# Patient Record
Sex: FEMALE | URBAN - METROPOLITAN AREA
[De-identification: names, ages, dates, MRNs, and addresses within clinical notes are randomized per-mention and may not be internally consistent; named-entity substitution may affect disease eponyms.]

---

## 2020-08-26 ENCOUNTER — OFFICE VISIT (OUTPATIENT)
Dept: URBAN - METROPOLITAN AREA CLINIC 45 | Facility: CLINIC | Age: 70
End: 2020-08-26
Payer: MEDICARE

## 2020-08-26 DIAGNOSIS — H35.371 PUCKERING OF MACULA, RIGHT EYE: ICD-10-CM

## 2020-08-26 PROCEDURE — 92134 CPTRZ OPH DX IMG PST SGM RTA: CPT | Performed by: OPTOMETRIST

## 2020-08-26 PROCEDURE — 99204 OFFICE O/P NEW MOD 45 MIN: CPT | Performed by: OPTOMETRIST

## 2020-08-26 ASSESSMENT — INTRAOCULAR PRESSURE
OS: 16
OD: 16

## 2020-08-26 NOTE — IMPRESSION/PLAN
Impression: Vitreous degeneration, right eye: H43.811. Plan: Discussed signs and symptoms of retinal detachment. Will continue to observe condition and or symptoms.

## 2020-08-26 NOTE — IMPRESSION/PLAN
Impression: Puckering of macula, right eye: H35.371.  Plan: oct ordered and done today ou- SRF OU, refer for retina consult

## 2021-03-17 ENCOUNTER — OFFICE VISIT (OUTPATIENT)
Dept: URBAN - METROPOLITAN AREA CLINIC 54 | Facility: CLINIC | Age: 71
End: 2021-03-17
Payer: MEDICARE

## 2021-03-17 DIAGNOSIS — E11.9 TYPE 2 DIABETES MELLITUS WITHOUT COMPLICATIONS: ICD-10-CM

## 2021-03-17 DIAGNOSIS — H35.073 RETINAL TELANGIECTASIS, BILATERAL: Primary | ICD-10-CM

## 2021-03-17 DIAGNOSIS — H25.13 AGE-RELATED NUCLEAR CATARACT, BILATERAL: ICD-10-CM

## 2021-03-17 DIAGNOSIS — H43.811 VITREOUS DEGENERATION, RIGHT EYE: ICD-10-CM

## 2021-03-17 PROCEDURE — 99214 OFFICE O/P EST MOD 30 MIN: CPT | Performed by: OPHTHALMOLOGY

## 2021-03-17 PROCEDURE — 92134 CPTRZ OPH DX IMG PST SGM RTA: CPT | Performed by: OPHTHALMOLOGY

## 2021-03-17 ASSESSMENT — INTRAOCULAR PRESSURE
OD: 15
OS: 12

## 2021-03-17 NOTE — IMPRESSION/PLAN
Impression: Type 2 diabetes mellitus without complications: A85.7. Plan: Thankfully, no ophthalmic manifestations at this time. BS/BP control emphasized with patient.

## 2021-03-17 NOTE — IMPRESSION/PLAN
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 We discussed that there is currently no treatment for macular telangectasia. The patient will call ASAP with any changes in vision/Amsler grid. 

6-12 months, OCT OU, reeval OU

## 2021-11-03 ENCOUNTER — OFFICE VISIT (OUTPATIENT)
Dept: URBAN - METROPOLITAN AREA CLINIC 45 | Facility: CLINIC | Age: 71
End: 2021-11-03
Payer: MEDICARE

## 2021-11-03 DIAGNOSIS — H52.4 PRESBYOPIA: ICD-10-CM

## 2021-11-03 PROCEDURE — 99213 OFFICE O/P EST LOW 20 MIN: CPT | Performed by: OPTOMETRIST

## 2021-11-03 ASSESSMENT — KERATOMETRY
OD: 46.38
OS: 46.13

## 2021-11-03 ASSESSMENT — VISUAL ACUITY
OS: 20/40
OD: 20/40

## 2022-05-11 ENCOUNTER — OFFICE VISIT (OUTPATIENT)
Dept: URBAN - METROPOLITAN AREA CLINIC 54 | Facility: CLINIC | Age: 72
End: 2022-05-11
Payer: MEDICARE

## 2022-05-11 DIAGNOSIS — H35.073 RETINAL TELANGIECTASIS, BILATERAL: Primary | ICD-10-CM

## 2022-05-11 DIAGNOSIS — H25.13 AGE-RELATED NUCLEAR CATARACT, BILATERAL: ICD-10-CM

## 2022-05-11 DIAGNOSIS — E11.9 TYPE 2 DIABETES MELLITUS WITHOUT COMPLICATIONS: ICD-10-CM

## 2022-05-11 DIAGNOSIS — H43.811 VITREOUS DEGENERATION, RIGHT EYE: ICD-10-CM

## 2022-05-11 PROCEDURE — 92134 CPTRZ OPH DX IMG PST SGM RTA: CPT | Performed by: OPHTHALMOLOGY

## 2022-05-11 PROCEDURE — 99214 OFFICE O/P EST MOD 30 MIN: CPT | Performed by: OPHTHALMOLOGY

## 2022-05-11 ASSESSMENT — INTRAOCULAR PRESSURE
OD: 14
OS: 13

## 2022-05-11 NOTE — IMPRESSION/PLAN
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 We discussed that there is currently no treatment for macular telangectasia. The patient will call ASAP with any changes in vision/Amsler grid. 

12 months, OCT OU, reeval OU

## 2022-05-11 NOTE — IMPRESSION/PLAN
Impression: Type 2 diabetes mellitus without complications: G53.0. Plan: Thankfully, no ophthalmic manifestations at this time. BS/BP control emphasized with patient.

## 2022-12-28 ENCOUNTER — OFFICE VISIT (OUTPATIENT)
Dept: URBAN - METROPOLITAN AREA CLINIC 45 | Facility: CLINIC | Age: 72
End: 2022-12-28
Payer: MEDICARE

## 2022-12-28 DIAGNOSIS — H35.81 RETINAL EDEMA: Primary | ICD-10-CM

## 2022-12-28 DIAGNOSIS — H25.13 AGE-RELATED NUCLEAR CATARACT, BILATERAL: ICD-10-CM

## 2022-12-28 PROCEDURE — 92134 CPTRZ OPH DX IMG PST SGM RTA: CPT | Performed by: OPTOMETRIST

## 2022-12-28 PROCEDURE — 92014 COMPRE OPH EXAM EST PT 1/>: CPT | Performed by: OPTOMETRIST

## 2022-12-28 RX ORDER — KETOROLAC TROMETHAMINE 5 MG/ML
0.5 % SOLUTION OPHTHALMIC
Qty: 1 | Refills: 1 | Status: ACTIVE
Start: 2022-12-28

## 2022-12-28 ASSESSMENT — INTRAOCULAR PRESSURE
OD: 15
OS: 15

## 2022-12-28 ASSESSMENT — VISUAL ACUITY
OS: 20/60
OD: 20/50

## 2022-12-28 NOTE — IMPRESSION/PLAN
Impression: Age-related nuclear cataract, bilateral: H25.13.  Plan: no improvement with refraction, consider cataract surgery after mac edema resolves

## 2022-12-28 NOTE — IMPRESSION/PLAN
Impression: Retinal edema: H35.81.  Plan: oct shows mac edema ou, start Ketorolac qid ou

consult with retina

discussed BS control

## 2023-02-20 ENCOUNTER — OFFICE VISIT (OUTPATIENT)
Dept: URBAN - METROPOLITAN AREA CLINIC 45 | Facility: CLINIC | Age: 73
End: 2023-02-20
Payer: MEDICARE

## 2023-02-20 DIAGNOSIS — H25.13 AGE-RELATED NUCLEAR CATARACT, BILATERAL: ICD-10-CM

## 2023-02-20 DIAGNOSIS — H35.073 RETINAL TELANGIECTASIS, BILATERAL: Primary | ICD-10-CM

## 2023-02-20 PROCEDURE — 92014 COMPRE OPH EXAM EST PT 1/>: CPT | Performed by: OPHTHALMOLOGY

## 2023-02-20 PROCEDURE — 92134 CPTRZ OPH DX IMG PST SGM RTA: CPT | Performed by: OPHTHALMOLOGY

## 2023-02-20 ASSESSMENT — INTRAOCULAR PRESSURE
OS: 16
OD: 16

## 2023-02-20 NOTE — IMPRESSION/PLAN
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 Plan: Discussed condition/plan with patient. No active leakage in the retina (gaps seen on mac OCT). No treatment needed today. Told to call immediately with changes. Patient understands/agrees with plan. OCT/optos ordered today.  
 6m OCT/exam

## 2023-02-20 NOTE — IMPRESSION/PLAN
Impression: Age-related nuclear cataract, bilateral: H25.13. Plan: Moderate cataract -- she does espouse glare/difficulty driving at night Discussed condition/plan with patient. Recommend update MRx first, and then CE if symptoms no better. Explained to her post CE VA limited by retinal health. Told to call immediately with changes. Patient understands/agrees with plan. OCT/optos ordered today.

## 2023-03-02 ENCOUNTER — OFFICE VISIT (OUTPATIENT)
Dept: URBAN - METROPOLITAN AREA CLINIC 45 | Facility: CLINIC | Age: 73
End: 2023-03-02
Payer: MEDICARE

## 2023-03-02 DIAGNOSIS — H35.073 RETINAL TELANGIECTASIS, BILATERAL: ICD-10-CM

## 2023-03-02 DIAGNOSIS — H25.13 AGE-RELATED NUCLEAR CATARACT, BILATERAL: Primary | ICD-10-CM

## 2023-03-02 DIAGNOSIS — H04.123 DRY EYE SYNDROME OF BILATERAL LACRIMAL GLANDS: ICD-10-CM

## 2023-03-02 PROCEDURE — 99214 OFFICE O/P EST MOD 30 MIN: CPT | Performed by: OPTOMETRIST

## 2023-03-02 ASSESSMENT — INTRAOCULAR PRESSURE
OS: 17
OD: 18

## 2023-03-02 ASSESSMENT — VISUAL ACUITY
OD: 20/40
OS: 20/50

## 2023-03-02 NOTE — IMPRESSION/PLAN
Impression: Age-related nuclear cataract, bilateral: H25.13. Plan: visually significant cataracts OU, schedule cataract extraction Os then Od. Discussed risk, benefits and alternatives. Discussed iol options.

## 2023-03-02 NOTE — IMPRESSION/PLAN
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 Plan: Will continue to observe condition and or symptoms.

## 2023-08-21 ENCOUNTER — OFFICE VISIT (OUTPATIENT)
Dept: URBAN - METROPOLITAN AREA CLINIC 45 | Facility: CLINIC | Age: 73
End: 2023-08-21
Payer: MEDICARE

## 2023-08-21 DIAGNOSIS — H43.811 VITREOUS DEGENERATION, RIGHT EYE: ICD-10-CM

## 2023-08-21 DIAGNOSIS — H25.13 AGE-RELATED NUCLEAR CATARACT, BILATERAL: Primary | ICD-10-CM

## 2023-08-21 DIAGNOSIS — H35.073 RETINAL TELANGIECTASIS, BILATERAL: ICD-10-CM

## 2023-08-21 PROCEDURE — 92014 COMPRE OPH EXAM EST PT 1/>: CPT | Performed by: OPHTHALMOLOGY

## 2023-08-21 PROCEDURE — 92134 CPTRZ OPH DX IMG PST SGM RTA: CPT | Performed by: OPHTHALMOLOGY

## 2023-08-21 ASSESSMENT — INTRAOCULAR PRESSURE
OS: 16
OD: 16

## 2024-02-29 ENCOUNTER — OFFICE VISIT (OUTPATIENT)
Dept: URBAN - METROPOLITAN AREA CLINIC 45 | Facility: CLINIC | Age: 74
End: 2024-02-29
Payer: MEDICARE

## 2024-02-29 DIAGNOSIS — H43.811 VITREOUS DEGENERATION, RIGHT EYE: ICD-10-CM

## 2024-02-29 DIAGNOSIS — H35.073 RETINAL TELANGIECTASIS, BILATERAL: Primary | ICD-10-CM

## 2024-02-29 DIAGNOSIS — H25.11 AGE-RELATED NUCLEAR CATARACT, RIGHT EYE: ICD-10-CM

## 2024-02-29 PROCEDURE — 92134 CPTRZ OPH DX IMG PST SGM RTA: CPT | Performed by: OPHTHALMOLOGY

## 2024-02-29 PROCEDURE — 92014 COMPRE OPH EXAM EST PT 1/>: CPT | Performed by: OPHTHALMOLOGY

## 2024-02-29 ASSESSMENT — INTRAOCULAR PRESSURE
OD: 13
OS: 13

## 2025-06-12 ENCOUNTER — OFFICE VISIT (OUTPATIENT)
Dept: URBAN - METROPOLITAN AREA CLINIC 45 | Facility: CLINIC | Age: 75
End: 2025-06-12
Payer: MEDICARE

## 2025-06-12 DIAGNOSIS — H35.073 RETINAL TELANGIECTASIS, BILATERAL: ICD-10-CM

## 2025-06-12 DIAGNOSIS — H26.491 OTHER SECONDARY CATARACT, RIGHT EYE: Primary | ICD-10-CM

## 2025-06-12 DIAGNOSIS — H04.123 DRY EYE SYNDROME OF BILATERAL LACRIMAL GLANDS: ICD-10-CM

## 2025-06-12 DIAGNOSIS — G43.101 MIGRAINE WITH AURA, NOT INTRACTABLE, WITH STATUS MIGRAINOSUS: ICD-10-CM

## 2025-06-12 DIAGNOSIS — H52.4 PRESBYOPIA: ICD-10-CM

## 2025-06-12 PROCEDURE — 92134 CPTRZ OPH DX IMG PST SGM RTA: CPT | Performed by: OPTOMETRIST

## 2025-06-12 PROCEDURE — 92014 COMPRE OPH EXAM EST PT 1/>: CPT | Performed by: OPTOMETRIST

## 2025-06-12 ASSESSMENT — VISUAL ACUITY
OD: 20/50
OS: 20/50

## 2025-06-12 ASSESSMENT — INTRAOCULAR PRESSURE
OD: 13
OS: 13